# Patient Record
Sex: MALE | Race: BLACK OR AFRICAN AMERICAN | NOT HISPANIC OR LATINO | Employment: FULL TIME | URBAN - METROPOLITAN AREA
[De-identification: names, ages, dates, MRNs, and addresses within clinical notes are randomized per-mention and may not be internally consistent; named-entity substitution may affect disease eponyms.]

---

## 2023-09-11 ENCOUNTER — OFFICE VISIT (OUTPATIENT)
Dept: PODIATRY | Facility: CLINIC | Age: 43
End: 2023-09-11
Payer: OTHER GOVERNMENT

## 2023-09-11 VITALS
SYSTOLIC BLOOD PRESSURE: 116 MMHG | WEIGHT: 201.19 LBS | BODY MASS INDEX: 32.33 KG/M2 | HEART RATE: 73 BPM | DIASTOLIC BLOOD PRESSURE: 75 MMHG | HEIGHT: 66 IN

## 2023-09-11 DIAGNOSIS — B35.3 TINEA PEDIS OF BOTH FEET: Primary | ICD-10-CM

## 2023-09-11 DIAGNOSIS — M72.2 PLANTAR FASCIITIS: ICD-10-CM

## 2023-09-11 PROCEDURE — 99203 PR OFFICE/OUTPT VISIT, NEW, LEVL III, 30-44 MIN: ICD-10-PCS | Mod: S$GLB,,, | Performed by: PODIATRIST

## 2023-09-11 PROCEDURE — 99203 OFFICE O/P NEW LOW 30 MIN: CPT | Mod: S$GLB,,, | Performed by: PODIATRIST

## 2023-09-11 RX ORDER — ASPIRIN 325 MG
TABLET, DELAYED RELEASE (ENTERIC COATED) ORAL
COMMUNITY
Start: 2023-09-02

## 2023-09-11 RX ORDER — IBUPROFEN 800 MG/1
800 TABLET ORAL 3 TIMES DAILY
Qty: 60 TABLET | Refills: 0 | Status: SHIPPED | OUTPATIENT
Start: 2023-09-11 | End: 2023-10-01

## 2023-09-11 RX ORDER — CLOTRIMAZOLE AND BETAMETHASONE DIPROPIONATE 10; .64 MG/G; MG/G
CREAM TOPICAL 2 TIMES DAILY
Qty: 45 G | Refills: 2 | Status: SHIPPED | OUTPATIENT
Start: 2023-09-11

## 2023-09-27 NOTE — PROGRESS NOTES
Subjective:     Patient ID: Kathrine Lamb is a 42 y.o. male.    Chief Complaint: Foot Pain    Kathrine is a 42 y.o. male who presents to the clinic complaining of heel pain in both feet, especially with the first step in the morning. The pain is described as Grabbing and Tight. The onset of the pain was gradual and has worsened slightly over the past several months. Kathrine rates the pain as 3/10. He denies a history of trauma. Prior treatments include none.  Patient also complains of dry itchy skin bilateral foot.    Review of Systems   Constitutional: Negative for chills and fever.   Cardiovascular:  Negative for chest pain and leg swelling.   Respiratory:  Negative for cough and shortness of breath.    Gastrointestinal:  Negative for diarrhea, nausea and vomiting.        Objective:     Physical Exam  Vitals reviewed.   Constitutional:       General: He is not in acute distress.     Appearance: Normal appearance. He is not ill-appearing.   HENT:      Head: Normocephalic.      Nose: Nose normal.   Cardiovascular:      Rate and Rhythm: Normal rate.   Pulmonary:      Effort: Pulmonary effort is normal. No respiratory distress.   Skin:     Capillary Refill: Capillary refill takes 2 to 3 seconds.   Neurological:      Mental Status: He is alert and oriented to person, place, and time.   Psychiatric:         Mood and Affect: Mood normal.         Behavior: Behavior normal.         Thought Content: Thought content normal.       Musculoskeletal:  5/5 muscle strength noted bilateral foot, ankle joint range of motion is full without pain, pain tenderness palpation bilateral calcaneal medial tubercle and medial band of bilateral plantar fascia, no masses noted bilateral foot   Dermatologic:  Dry, flaky, erythematous skin noted in a moccasin distribution bilateral lower extremity, no open lesions noted bilateral foot, no interdigital maceration noted bilateral foot   Vascular:  DP and PT pulses palpable 2/4 bilateral foot,  capillary fill time less than 3 seconds digits aspect the digits bilateral foot   Neurologic:  Protective and light touch sensation intact bilateral lower extremity,    Assessment:      Encounter Diagnoses   Name Primary?    Tinea pedis of both feet Yes    Plantar fasciitis      Plan:     Kathrine was seen today for foot pain.    Diagnoses and all orders for this visit:    Tinea pedis of both feet  -     clotrimazole-betamethasone 1-0.05% (LOTRISONE) cream; Apply topically 2 (two) times daily.    Plantar fasciitis  -     ibuprofen (ADVIL,MOTRIN) 800 MG tablet; Take 1 tablet (800 mg total) by mouth 3 (three) times daily. for 20 days      I counseled the patient on his conditions, their implications and medical management.        1. Patient was examined and evaluated.    2. Discussed with patient the etiology of tinea pedis.  Patient was advised of proper use of Lotrisone topical cream.    3. Discussed with patient etiology of plantar fasciitis.  Conservative treatment options were discussed in detail with the patient.  Patient was advised to elevate and ice the lower extremities when at rest.  Patient was advised to possible benefits of prescription NSAIDs pain relief.  Discussed with patient possible progression to steroid therapy if pain complaints worsen over time, including local corticosteroid injection or oral Medrol Dosepak.    4. Patient was advised to alter shoe gear for better comfort and fit.  Patient will select shoe gear with supportive soles and consider OTC shoe inserts.  5. Patient will follow-up in 3 weeks or p.r.n. for complaints

## 2023-10-02 ENCOUNTER — OFFICE VISIT (OUTPATIENT)
Dept: PODIATRY | Facility: CLINIC | Age: 43
End: 2023-10-02
Payer: OTHER GOVERNMENT

## 2023-10-02 VITALS
DIASTOLIC BLOOD PRESSURE: 81 MMHG | HEART RATE: 73 BPM | SYSTOLIC BLOOD PRESSURE: 125 MMHG | BODY MASS INDEX: 32.3 KG/M2 | WEIGHT: 201 LBS | HEIGHT: 66 IN

## 2023-10-02 DIAGNOSIS — M79.671 RIGHT FOOT PAIN: ICD-10-CM

## 2023-10-02 DIAGNOSIS — R26.2 DIFFICULTY WALKING: ICD-10-CM

## 2023-10-02 DIAGNOSIS — M72.2 PLANTAR FASCIITIS: Primary | ICD-10-CM

## 2023-10-02 PROCEDURE — 20550 NJX 1 TENDON SHEATH/LIGAMENT: CPT | Mod: RT,S$GLB,, | Performed by: PODIATRIST

## 2023-10-02 PROCEDURE — 20550 PR INJECT TENDON SHEATH/LIGAMENT: ICD-10-PCS | Mod: RT,S$GLB,, | Performed by: PODIATRIST

## 2023-10-02 PROCEDURE — 99213 OFFICE O/P EST LOW 20 MIN: CPT | Mod: 25,S$GLB,, | Performed by: PODIATRIST

## 2023-10-02 PROCEDURE — 99213 PR OFFICE/OUTPT VISIT, EST, LEVL III, 20-29 MIN: ICD-10-PCS | Mod: 25,S$GLB,, | Performed by: PODIATRIST

## 2023-10-02 RX ADMIN — DEXAMETHASONE SODIUM PHOSPHATE 4 MG: 4 INJECTION, SOLUTION INTRA-ARTICULAR; INTRALESIONAL; INTRAMUSCULAR; INTRAVENOUS; SOFT TISSUE at 03:10

## 2023-10-02 RX ADMIN — LIDOCAINE HYDROCHLORIDE 1 ML: 10 INJECTION INFILTRATION; PERINEURAL at 03:10

## 2023-10-11 RX ORDER — DEXAMETHASONE SODIUM PHOSPHATE 4 MG/ML
4 INJECTION, SOLUTION INTRA-ARTICULAR; INTRALESIONAL; INTRAMUSCULAR; INTRAVENOUS; SOFT TISSUE
Status: DISCONTINUED | OUTPATIENT
Start: 2023-10-02 | End: 2023-10-11 | Stop reason: HOSPADM

## 2023-10-11 RX ORDER — LIDOCAINE HYDROCHLORIDE 10 MG/ML
1 INJECTION INFILTRATION; PERINEURAL
Status: DISCONTINUED | OUTPATIENT
Start: 2023-10-02 | End: 2023-10-11 | Stop reason: HOSPADM

## 2023-10-11 NOTE — PROGRESS NOTES
Subjective:     Patient ID: Kathrine Lamb is a 42 y.o. male.    Chief Complaint: Foot Pain    Kathrine is a 42 y.o. male who presents to the clinic complaining of heel pain in the right foot, especially with the first step in the morning. The pain is described as Throbbing, Grabbing, and Tight. The onset of the pain was gradual and has worsened slightly over the past several weeks. Kathrine rates the pain as 5/10. He denies a history of trauma. Prior treatments include stretching and OTC NSAIDs.    Review of Systems   Constitutional: Negative for chills and fever.   Cardiovascular:  Negative for chest pain and leg swelling.   Respiratory:  Negative for cough and shortness of breath.    Gastrointestinal:  Negative for diarrhea, nausea and vomiting.        Objective:     Physical Exam  Vitals reviewed.   Constitutional:       General: He is not in acute distress.     Appearance: Normal appearance. He is not ill-appearing.   HENT:      Head: Normocephalic.      Nose: Nose normal.   Cardiovascular:      Rate and Rhythm: Normal rate.   Pulmonary:      Effort: Pulmonary effort is normal. No respiratory distress.   Skin:     Capillary Refill: Capillary refill takes 2 to 3 seconds.   Neurological:      Mental Status: He is alert and oriented to person, place, and time.   Psychiatric:         Mood and Affect: Mood normal.         Behavior: Behavior normal.         Thought Content: Thought content normal.          Musculoskeletal:  5/5 muscle strength noted bilateral foot, ankle joint range of motion is full without pain, pain and tenderness with palpation right calcaneal medial tubercle and medial band of right plantar fascia, no masses noted bilateral foot   Dermatologic:  improving Dry, flaky, erythematous skin noted in a moccasin distribution bilateral lower extremity, no open lesions noted bilateral foot, no interdigital maceration noted bilateral foot   Vascular:  DP and PT pulses palpable 2/4 bilateral foot, capillary  fill time less than 3 seconds digits aspect the digits bilateral foot   Neurologic:  Protective and light touch sensation intact bilateral lower extremity,    Assessment:      Encounter Diagnoses   Name Primary?    Plantar fasciitis Yes    Right foot pain     Difficulty walking      Plan:     Kathrine was seen today for foot pain.    Diagnoses and all orders for this visit:    Plantar fasciitis  -     Tendon Sheath    Right foot pain  -     Tendon Sheath    Difficulty walking  -     Tendon Sheath      I counseled the patient on his conditions, their implications and medical management.        1. Patient was examined and evaluated.    2. Discussed patient etiology of plantar fasciitis.  Stretching instructions were demonstrated and discussed in detail with the patient.  Patient was advised ice and elevate the lower extremity when at rest.  Discussed with patient possible benefits of OTC insoles such as Powerstep within his work boots.  Patient was advised to adjunct with OTC NSAIDs for pain relief.  Discussed with patient potential benefits of a local corticosteroid injection oral Medrol Dosepak for improvement of pain complaints.    Tendon Sheath    Date/Time: 10/2/2023 3:30 PM    Performed by: Ming Dominguez DPM  Authorized by: Ming Dominguez DPM    Consent Done?:  Yes (Verbal)  Indications:  Pain  Location:  Foot (right)  Foot joint: right plantar fascia.  Ultrasonic guidance for needle placement?: No    Needle size:  25 G  Approach:  Medial  Medications:  1 mL LIDOcaine HCL 10 mg/ml (1%) 10 mg/mL (1 %); 4 mg dexAMETHasone 4 mg/mL  Patient tolerance:  Patient tolerated the procedure well with no immediate complications      3. Patient made aware that previous treated tinea pedis seems to be improving.  Patient will continue with topical Lotrisone cream as prescribed.    4. Patient will follow-up  in 2 weeks or p.r.n. for complaints